# Patient Record
Sex: FEMALE | Race: WHITE | Employment: OTHER | URBAN - METROPOLITAN AREA
[De-identification: names, ages, dates, MRNs, and addresses within clinical notes are randomized per-mention and may not be internally consistent; named-entity substitution may affect disease eponyms.]

---

## 2020-09-28 ENCOUNTER — HOSPITAL ENCOUNTER (EMERGENCY)
Age: 65
Discharge: HOME | End: 2020-09-28
Attending: EMERGENCY MEDICINE
Payer: MEDICARE

## 2020-09-28 ENCOUNTER — APPOINTMENT (OUTPATIENT)
Dept: GENERAL RADIOLOGY | Age: 65
End: 2020-09-28
Attending: EMERGENCY MEDICINE
Payer: MEDICARE

## 2020-09-28 VITALS
DIASTOLIC BLOOD PRESSURE: 76 MMHG | SYSTOLIC BLOOD PRESSURE: 156 MMHG | HEIGHT: 65 IN | RESPIRATION RATE: 16 BRPM | WEIGHT: 214 LBS | HEART RATE: 81 BPM | OXYGEN SATURATION: 98 % | TEMPERATURE: 98.8 F | BODY MASS INDEX: 35.65 KG/M2

## 2020-09-28 DIAGNOSIS — S80.01XA CONTUSION OF RIGHT KNEE, INITIAL ENCOUNTER: ICD-10-CM

## 2020-09-28 DIAGNOSIS — S93.402A SPRAIN OF LEFT ANKLE, UNSPECIFIED LIGAMENT, INITIAL ENCOUNTER: Primary | ICD-10-CM

## 2020-09-28 PROCEDURE — 73630 X-RAY EXAM OF FOOT: CPT

## 2020-09-28 PROCEDURE — 74011250637 HC RX REV CODE- 250/637: Performed by: EMERGENCY MEDICINE

## 2020-09-28 PROCEDURE — 73562 X-RAY EXAM OF KNEE 3: CPT

## 2020-09-28 PROCEDURE — 73610 X-RAY EXAM OF ANKLE: CPT

## 2020-09-28 PROCEDURE — 99283 EMERGENCY DEPT VISIT LOW MDM: CPT

## 2020-09-28 RX ORDER — TRAMADOL HYDROCHLORIDE 50 MG/1
50 TABLET ORAL
Qty: 12 TAB | Refills: 0 | Status: SHIPPED | OUTPATIENT
Start: 2020-09-28 | End: 2020-10-01

## 2020-09-28 RX ORDER — TRAMADOL HYDROCHLORIDE 50 MG/1
50 TABLET ORAL
Status: COMPLETED | OUTPATIENT
Start: 2020-09-28 | End: 2020-09-28

## 2020-09-28 RX ADMIN — TRAMADOL HYDROCHLORIDE 50 MG: 50 TABLET, FILM COATED ORAL at 13:03

## 2020-09-28 NOTE — ED TRIAGE NOTES
Patient reports was coming down stairs and missed a step and fell now has left ankle pain right calf pain right knee is popping and a abrasion to left palm

## 2020-09-28 NOTE — DISCHARGE INSTRUCTIONS
Meds as prescribed and avoid prolonged walking or standing. Follow-up with your PCP in 3 to 5 days as needed.

## 2020-09-28 NOTE — ED PROVIDER NOTES
EMERGENCY DEPARTMENT HISTORY AND PHYSICAL EXAM      Date: 9/28/2020  Patient Name: Blanquita Koehler    History of Presenting Illness     Chief Complaint   Patient presents with    Ankle Pain    Foot Pain       History Provided By: Patient    HPI: Blanquita Koehler, 76 y.o. female with a past medical history significant hyperlipidemia presents to the ED with cc of last ankle pain and swelling to a ground-level fall earlier this morning in which she hit her left foot and ankle and her right knee. She also sustained a minor abrasion to the palm of her right hand. No head injury or loss of consciousness reported. No chest pain or palpitations or other constitutional symptoms. There are no other complaints, changes, or physical findings at this time. PCP: Yg Watson MD    No current facility-administered medications on file prior to encounter. No current outpatient medications on file prior to encounter. Past History     Past Medical History:  Past Medical History:   Diagnosis Date    Hypercholesteremia        Past Surgical History:  Past Surgical History:   Procedure Laterality Date    HX CHOLECYSTECTOMY      HX HYSTERECTOMY         Family History:  History reviewed. No pertinent family history. Social History:  Social History     Tobacco Use    Smoking status: Never Smoker    Smokeless tobacco: Never Used   Substance Use Topics    Alcohol use: Yes     Comment: socially     Drug use: Never       Allergies: Allergies   Allergen Reactions    Sulfa (Sulfonamide Antibiotics) Hives         Review of Systems     Review of Systems   Constitutional: Negative. HENT: Negative. Eyes: Negative. Respiratory: Negative. Cardiovascular: Negative. Gastrointestinal: Negative. Endocrine: Negative. Genitourinary: Negative. Musculoskeletal: Positive for arthralgias and joint swelling. As in HPI   Skin: Negative. Neurological: Negative. Hematological: Negative. Psychiatric/Behavioral: Negative. All other systems reviewed and are negative. Physical Exam     Physical Exam  Vitals signs and nursing note reviewed. Constitutional:       Appearance: Normal appearance. She is normal weight. HENT:      Head: Normocephalic and atraumatic. Nose: Nose normal.      Mouth/Throat:      Mouth: Mucous membranes are moist.      Pharynx: Oropharynx is clear. Eyes:      Extraocular Movements: Extraocular movements intact. Conjunctiva/sclera: Conjunctivae normal.      Pupils: Pupils are equal, round, and reactive to light. Neck:      Musculoskeletal: Normal range of motion and neck supple. Cardiovascular:      Rate and Rhythm: Normal rate and regular rhythm. Pulses: Normal pulses. Heart sounds: Normal heart sounds. Pulmonary:      Effort: Pulmonary effort is normal.      Breath sounds: Normal breath sounds. Abdominal:      General: Abdomen is flat. Palpations: Abdomen is soft. Musculoskeletal: Normal range of motion. General: Tenderness and signs of injury present. Comments: Left foot tenderness and right knee tenderness   Skin:     General: Skin is warm and dry. Capillary Refill: Capillary refill takes less than 2 seconds. Neurological:      General: No focal deficit present. Mental Status: She is alert and oriented to person, place, and time. Psychiatric:         Mood and Affect: Mood normal.         Behavior: Behavior normal.         Diagnostic Study Results     Labs -   No results found for this or any previous visit (from the past 12 hour(s)). Radiologic Studies -   @lastxrresult@  CT Results  (Last 48 hours)    None        CXR Results  (Last 48 hours)    None      Right knee xray  Study Result     EXAM: XR KNEE RT 3 V     INDICATION: injury     COMPARISON: None.     FINDINGS:   No acute fracture or dislocation. No radiopaque foreign object.  Mild  degenerative changes with mild narrowing of the medial femorotibial compartment.        IMPRESSION  IMPRESSION:   No acute osseous abnormality.        Left ankle xray  Study Result     EXAM: XR ANKLE LT MIN 3 V, XR FOOT LT MIN 3 V     INDICATION: injury     COMPARISON: None.     FINDINGS:   No acute fracture or dislocation in the left ankle or the foot. Tibiotalar joint  well aligned. No radiopaque foreign object.      Some irregularity noted in the dorsal aspect of the talar neck. Achilles and  plantar enthesophytes. Soft tissue swelling.     IMPRESSION  IMPRESSION:  Age indeterminant irregularity noted in the dorsal aspect of the talar neck,  which can be seen in avulsion injury. Correlate with physical exam.     No definitive acute osseous abnormali     Left Ankle xray  Study Result     EXAM: XR ANKLE LT MIN 3 V, XR FOOT LT MIN 3 V     INDICATION: injury     COMPARISON: None.     FINDINGS:   No acute fracture or dislocation in the left ankle or the foot. Tibiotalar joint  well aligned. No radiopaque foreign object.      Some irregularity noted in the dorsal aspect of the talar neck. Achilles and  plantar enthesophytes. Soft tissue swelling.     IMPRESSION  IMPRESSION:  Age indeterminant irregularity noted in the dorsal aspect of the talar neck,  which can be seen in avulsion injury. Correlate with physical exam.     No definitive acute osseous abnormality. Medical Decision Making   I am the first provider for this patient. I reviewed the vital signs, available nursing notes, past medical history, past surgical history, family history and social history. Vital Signs-Reviewed the patient's vital signs. No data found. Records Reviewed: Nursing Notes    Provider Notes (Medical Decision Making): Uneventful ED course, clinical improvement with therapy, patient will be discharged to followup with PCP as directed      ED Course:   Initial assessment performed.  The patients presenting problems have been discussed, and they are in agreement with the care plan formulated and outlined with them. I have encouraged them to ask questions as they arise throughout their visit. PLAN:  1. Discharge Medication List as of 9/28/2020  2:58 PM        2. Follow-up Information     Follow up With Specialties Details Why Contact Info    Svetlana Peres MD Family Medicine In 3 days As needed [address] [place] [address]  [phone]          Return to ED if worse     Diagnosis     Clinical Impression:   1. Sprain of left ankle, unspecified ligament, initial encounter    2. Contusion of right knee, initial encounter        Patients ED visit today does not show an emergent medical condition that requires inpatient admission; The diagnosis, treatment plan and the need for follow up have been discussed in detail with the patient and he has been given the opportunity to ask questions. All such questions at this time, have been satisfactorily answered; patient remains in stable condition for discharge.